# Patient Record
Sex: FEMALE | Race: WHITE | NOT HISPANIC OR LATINO | Employment: FULL TIME | URBAN - METROPOLITAN AREA
[De-identification: names, ages, dates, MRNs, and addresses within clinical notes are randomized per-mention and may not be internally consistent; named-entity substitution may affect disease eponyms.]

---

## 2024-07-22 ENCOUNTER — HOSPITAL ENCOUNTER (EMERGENCY)
Facility: HOSPITAL | Age: 39
Discharge: HOME | End: 2024-07-23
Attending: EMERGENCY MEDICINE

## 2024-07-22 DIAGNOSIS — M54.9 BACK PAIN: ICD-10-CM

## 2024-07-22 DIAGNOSIS — M54.12 CERVICAL RADICULOPATHY: Primary | ICD-10-CM

## 2024-07-22 LAB
B-HCG UR QL: NEGATIVE
CTP QC/QA: YES

## 2024-07-22 PROCEDURE — 96375 TX/PRO/DX INJ NEW DRUG ADDON: CPT

## 2024-07-22 PROCEDURE — 81025 URINE PREGNANCY TEST: CPT | Performed by: EMERGENCY MEDICINE

## 2024-07-22 PROCEDURE — 99285 EMERGENCY DEPT VISIT HI MDM: CPT | Mod: 25

## 2024-07-22 PROCEDURE — 96372 THER/PROPH/DIAG INJ SC/IM: CPT | Mod: 59 | Performed by: EMERGENCY MEDICINE

## 2024-07-22 PROCEDURE — 63600175 PHARM REV CODE 636 W HCPCS: Performed by: EMERGENCY MEDICINE

## 2024-07-22 PROCEDURE — 96374 THER/PROPH/DIAG INJ IV PUSH: CPT

## 2024-07-22 RX ORDER — TRIAMCINOLONE ACETONIDE 40 MG/ML
80 INJECTION, SUSPENSION INTRA-ARTICULAR; INTRAMUSCULAR
Status: COMPLETED | OUTPATIENT
Start: 2024-07-22 | End: 2024-07-22

## 2024-07-22 RX ADMIN — TRIAMCINOLONE ACETONIDE 80 MG: 200 INJECTION, SUSPENSION INTRA-ARTICULAR; INTRAMUSCULAR at 11:07

## 2024-07-23 VITALS
DIASTOLIC BLOOD PRESSURE: 88 MMHG | HEART RATE: 73 BPM | TEMPERATURE: 98 F | BODY MASS INDEX: 25.9 KG/M2 | HEIGHT: 67 IN | SYSTOLIC BLOOD PRESSURE: 137 MMHG | RESPIRATION RATE: 20 BRPM | WEIGHT: 165 LBS | OXYGEN SATURATION: 99 %

## 2024-07-23 LAB
ALBUMIN SERPL BCP-MCNC: 4.1 G/DL (ref 3.5–5.2)
ALP SERPL-CCNC: 91 U/L (ref 55–135)
ALT SERPL W/O P-5'-P-CCNC: 18 U/L (ref 10–44)
ANION GAP SERPL CALC-SCNC: 9 MMOL/L (ref 8–16)
AST SERPL-CCNC: 19 U/L (ref 10–40)
BASOPHILS # BLD AUTO: 0.06 K/UL (ref 0–0.2)
BASOPHILS NFR BLD: 0.8 % (ref 0–1.9)
BILIRUB SERPL-MCNC: 0.2 MG/DL (ref 0.1–1)
BUN SERPL-MCNC: 13 MG/DL (ref 6–20)
CALCIUM SERPL-MCNC: 9.4 MG/DL (ref 8.7–10.5)
CHLORIDE SERPL-SCNC: 107 MMOL/L (ref 95–110)
CO2 SERPL-SCNC: 24 MMOL/L (ref 23–29)
CREAT SERPL-MCNC: 0.9 MG/DL (ref 0.5–1.4)
DIFFERENTIAL METHOD BLD: ABNORMAL
EOSINOPHIL # BLD AUTO: 0.7 K/UL (ref 0–0.5)
EOSINOPHIL NFR BLD: 8.6 % (ref 0–8)
ERYTHROCYTE [DISTWIDTH] IN BLOOD BY AUTOMATED COUNT: 13.2 % (ref 11.5–14.5)
EST. GFR  (NO RACE VARIABLE): >60 ML/MIN/1.73 M^2
GLUCOSE SERPL-MCNC: 76 MG/DL (ref 70–110)
HCT VFR BLD AUTO: 43.2 % (ref 37–48.5)
HCV AB SERPL QL IA: NORMAL
HGB BLD-MCNC: 13.9 G/DL (ref 12–16)
HIV 1+2 AB+HIV1 P24 AG SERPL QL IA: NORMAL
IMM GRANULOCYTES # BLD AUTO: 0.03 K/UL (ref 0–0.04)
IMM GRANULOCYTES NFR BLD AUTO: 0.4 % (ref 0–0.5)
LYMPHOCYTES # BLD AUTO: 2.4 K/UL (ref 1–4.8)
LYMPHOCYTES NFR BLD: 29.7 % (ref 18–48)
MCH RBC QN AUTO: 29.5 PG (ref 27–31)
MCHC RBC AUTO-ENTMCNC: 32.2 G/DL (ref 32–36)
MCV RBC AUTO: 92 FL (ref 82–98)
MONOCYTES # BLD AUTO: 0.7 K/UL (ref 0.3–1)
MONOCYTES NFR BLD: 9.3 % (ref 4–15)
NEUTROPHILS # BLD AUTO: 4.1 K/UL (ref 1.8–7.7)
NEUTROPHILS NFR BLD: 51.2 % (ref 38–73)
NRBC BLD-RTO: 0 /100 WBC
PLATELET # BLD AUTO: 308 K/UL (ref 150–450)
PMV BLD AUTO: 10.4 FL (ref 9.2–12.9)
POTASSIUM SERPL-SCNC: 3.8 MMOL/L (ref 3.5–5.1)
PROT SERPL-MCNC: 7.6 G/DL (ref 6–8.4)
RBC # BLD AUTO: 4.71 M/UL (ref 4–5.4)
SODIUM SERPL-SCNC: 140 MMOL/L (ref 136–145)
WBC # BLD AUTO: 7.98 K/UL (ref 3.9–12.7)

## 2024-07-23 PROCEDURE — 87389 HIV-1 AG W/HIV-1&-2 AB AG IA: CPT | Performed by: PHYSICIAN ASSISTANT

## 2024-07-23 PROCEDURE — 86803 HEPATITIS C AB TEST: CPT | Performed by: PHYSICIAN ASSISTANT

## 2024-07-23 PROCEDURE — 80053 COMPREHEN METABOLIC PANEL: CPT | Performed by: EMERGENCY MEDICINE

## 2024-07-23 PROCEDURE — 63600175 PHARM REV CODE 636 W HCPCS: Performed by: EMERGENCY MEDICINE

## 2024-07-23 PROCEDURE — 85025 COMPLETE CBC W/AUTO DIFF WBC: CPT | Performed by: EMERGENCY MEDICINE

## 2024-07-23 RX ORDER — ONDANSETRON HYDROCHLORIDE 2 MG/ML
4 INJECTION, SOLUTION INTRAVENOUS
Status: COMPLETED | OUTPATIENT
Start: 2024-07-23 | End: 2024-07-23

## 2024-07-23 RX ORDER — METHOCARBAMOL 500 MG/1
500 TABLET, FILM COATED ORAL 3 TIMES DAILY PRN
Qty: 30 TABLET | Refills: 0 | Status: SHIPPED | OUTPATIENT
Start: 2024-07-23 | End: 2024-08-02

## 2024-07-23 RX ORDER — GABAPENTIN 300 MG/1
300 CAPSULE ORAL 3 TIMES DAILY PRN
Qty: 15 CAPSULE | Refills: 0 | Status: SHIPPED | OUTPATIENT
Start: 2024-07-23 | End: 2024-07-23

## 2024-07-23 RX ORDER — IBUPROFEN 600 MG/1
600 TABLET ORAL EVERY 6 HOURS PRN
Qty: 20 TABLET | Refills: 0 | Status: SHIPPED | OUTPATIENT
Start: 2024-07-23 | End: 2024-07-23

## 2024-07-23 RX ORDER — GABAPENTIN 300 MG/1
300 CAPSULE ORAL 3 TIMES DAILY PRN
Qty: 15 CAPSULE | Refills: 0 | Status: SHIPPED | OUTPATIENT
Start: 2024-07-23

## 2024-07-23 RX ORDER — MORPHINE SULFATE 2 MG/ML
6 INJECTION, SOLUTION INTRAMUSCULAR; INTRAVENOUS
Status: COMPLETED | OUTPATIENT
Start: 2024-07-23 | End: 2024-07-23

## 2024-07-23 RX ORDER — IBUPROFEN 600 MG/1
600 TABLET ORAL EVERY 6 HOURS PRN
Qty: 20 TABLET | Refills: 0 | Status: SHIPPED | OUTPATIENT
Start: 2024-07-23

## 2024-07-23 RX ORDER — METHOCARBAMOL 500 MG/1
500 TABLET, FILM COATED ORAL 3 TIMES DAILY PRN
Qty: 30 TABLET | Refills: 0 | Status: SHIPPED | OUTPATIENT
Start: 2024-07-23 | End: 2024-07-23

## 2024-07-23 RX ADMIN — MORPHINE SULFATE 6 MG: 2 INJECTION, SOLUTION INTRAMUSCULAR; INTRAVENOUS at 01:07

## 2024-07-23 RX ADMIN — ONDANSETRON 4 MG: 2 INJECTION INTRAMUSCULAR; INTRAVENOUS at 01:07

## 2024-07-23 NOTE — ED TRIAGE NOTES
"Heide Aguilera, a 39 y.o. female presents to the ED w/ complaint of left sided neck pain that started yesterday, thinks she might have "twerked" her back, states that the pain is radiating down her side and down left arm, reports left arm heaviness, states that this happened similar on the right side when she pulled her back out.     Triage note:  Chief Complaint   Patient presents with    Neck Pain     Patient reports pain radiating from left neck into left arm since Saturday. States that it is worsening.     Review of patient's allergies indicates:  No Known Allergies  History reviewed. No pertinent past medical history.      APPEARANCE: awake and alert in NAD.  SKIN: warm, dry and intact. No breakdown or bruising.  MUSCULOSKELETAL: Patient moving all extremities spontaneously, no obvious swelling or deformities noted. Ambulates independently. + left sided neck pain and arm pain  RESPIRATORY: Denies shortness of breath.Respirations unlabored.   CARDIAC: Denies CP, 2+ distal pulses; no peripheral edema  ABDOMEN: S/ND/NT, Denies nausea,   : voids spontaneously, denies difficulty  Neurologic: AAO x 4; follows commands equal strength in all extremities; denies numbness/tingling. Denies dizziness, BERT, pupils 3mm    "

## 2024-07-23 NOTE — ED PROVIDER NOTES
Encounter Date: 7/22/2024       History     Chief Complaint   Patient presents with    Neck Pain     Patient reports pain radiating from left neck into left arm since Saturday. States that it is worsening.     39-year-old female presents with pain to the left upper back that is worse with movement.  She feels like she has to hold her shoulder and arm in a certain way to alleviate the discomfort.  She had a similar problem that lasted for about a month on the right side.  She went to physical therapy and eventually it got better.  She took a muscle relaxant last night and it did not seem to help.  Denies any numbness or weakness to the arm.  No chest discomfort or shortness of breath.        Review of patient's allergies indicates:  No Known Allergies  History reviewed. No pertinent past medical history.  History reviewed. No pertinent surgical history.  No family history on file.     Review of Systems    Physical Exam     Initial Vitals   BP Pulse Resp Temp SpO2   07/22/24 2029 07/22/24 2028 07/22/24 2029 07/22/24 2029 07/22/24 2029   (!) 153/87 78 18 97.4 °F (36.3 °C) 100 %      MAP       --                Physical Exam    Constitutional: She appears well-developed and well-nourished. No distress.   HENT:   Head: Normocephalic.   Eyes: Conjunctivae are normal. Pupils are equal, round, and reactive to light.   Neck: Neck supple. No JVD present.   Normal range of motion.  Cardiovascular:  Normal rate, regular rhythm and normal heart sounds.           No murmur heard.  Pulmonary/Chest: Breath sounds normal. No respiratory distress. She has no wheezes. She has no rhonchi. She has no rales. She exhibits no tenderness.   Abdominal: Abdomen is soft. Bowel sounds are normal. She exhibits no distension. There is no abdominal tenderness. There is no rebound.   Musculoskeletal:      Cervical back: Normal range of motion and neck supple.      Comments: She has left paraspinous muscle tenderness in the upper back.  Neck is  supple with no tenderness     Neurological: She is alert. She has normal strength. No cranial nerve deficit or sensory deficit.   Careful testing of axillary radial ulnar medial nerve function in the left upper extremity were done and normal to strength and sensation.  Negative Thanh.  Strong radial pulse.  No pain on range of motion of the arm or shoulder.   Psychiatric: She has a normal mood and affect.         ED Course   Procedures  Labs Reviewed   CBC W/ AUTO DIFFERENTIAL - Abnormal       Result Value    WBC 7.98      RBC 4.71      Hemoglobin 13.9      Hematocrit 43.2      MCV 92      MCH 29.5      MCHC 32.2      RDW 13.2      Platelets 308      MPV 10.4      Immature Granulocytes 0.4      Gran # (ANC) 4.1      Immature Grans (Abs) 0.03      Lymph # 2.4      Mono # 0.7      Eos # 0.7 (*)     Baso # 0.06      nRBC 0      Gran % 51.2      Lymph % 29.7      Mono % 9.3      Eosinophil % 8.6 (*)     Basophil % 0.8      Differential Method Automated     COMPREHENSIVE METABOLIC PANEL    Sodium 140      Potassium 3.8      Chloride 107      CO2 24      Glucose 76      BUN 13      Creatinine 0.9      Calcium 9.4      Total Protein 7.6      Albumin 4.1      Total Bilirubin 0.2      Alkaline Phosphatase 91      AST 19      ALT 18      eGFR >60.0      Anion Gap 9     HIV 1 / 2 ANTIBODY   HEPATITIS C ANTIBODY   POCT URINE PREGNANCY    POC Preg Test, Ur Negative       Acceptable Yes            Imaging Results              MRI Cervical Spine Without Contrast (In process)                      MRI Thoracic Spine Without Contrast (In process)                      X-Ray Chest 1 View (Final result)  Result time 07/22/24 23:29:35      Final result by Jimmy Blackmon MD (07/22/24 23:29:35)                   Impression:      No acute findings in the chest.      Electronically signed by: Jimmy Blackmon MD  Date:    07/22/2024  Time:    23:29               Narrative:    EXAMINATION:  XR CHEST 1 VIEW    CLINICAL  HISTORY:  Dorsalgia, unspecified    TECHNIQUE:  Single frontal view of the chest was performed.    COMPARISON:  None    FINDINGS:  No consolidation, pleural effusion or pneumothorax.    Cardiomediastinal silhouette is unremarkable.                                       X-Ray Shoulder 2 or More Views Left (Final result)  Result time 07/22/24 23:29:57      Final result by Jimmy Blackmon MD (07/22/24 23:29:57)                   Impression:      No acute fracture.      Electronically signed by: Jimmy Blackmon MD  Date:    07/22/2024  Time:    23:29               Narrative:    EXAMINATION:  XR SHOULDER COMPLETE 2 OR MORE VIEWS LEFT    CLINICAL HISTORY:  sholder pain;    TECHNIQUE:  Two or three views of the left shoulder were performed.    COMPARISON:  None    FINDINGS:  Bones: No fracture.  No lytic or blastic lesion.    Joints: No evidence for glenohumeral dislocation.  Acromioclavicular joint is unremarkable.    Soft tissues: Unremarkable.                                       Medications   triamcinolone acetonide injection 80 mg (80 mg Intramuscular Given 7/22/24 3786)   morphine injection 6 mg (6 mg Intravenous Given 7/23/24 0102)   ondansetron injection 4 mg (4 mg Intravenous Given 7/23/24 0102)     Medical Decision Making  Patient with left upper back pain.  Seems muscular in nature.  Did x-ray of shoulder and chest that showed abnormality.  Highly doubt acute coronary syndrome, pulmonary embolus, aortic dissection.  No sign of septic joint dislocation nerve dysfunction.  I do not believe this is a cervical radiculitis.  Patient is requesting steroid shot and/or trigger point injection.  I found no area to inject would be considered a trigger point.  Will give cortisone injection and discharge home on NSAIDs and Robaxin recommend she follow up with an orthopedist.      I re-evaluated patient.  She states that she is in even worse pain than when she got here.  The pain is unbearable to her lower neck  radiating down her arm.  She is requesting an MRI and stronger pain medication.  Will obtain MRI of her cervical and thoracic spine.  Will give dose of morphine.  Still has no neurologic dysfunction.  Case signed out to Dr. Barreto at 1:45 a.m.    Amount and/or Complexity of Data Reviewed  Labs: ordered.  Radiology: ordered.    Risk  Prescription drug management.                                      Clinical Impression:  Final diagnoses:  [M54.9] Back pain                 Deepak Tamez MD  07/23/24 0135

## 2024-07-23 NOTE — FIRST PROVIDER EVALUATION
Medical screening examination initiated.  I have conducted a focused provider triage encounter, findings are as follows:    Brief history of present illness:  39-year-old female complaining of left upper back/neck pain radiating to her left arm.  Her symptoms began on Saturday and have been progressively worsening.  Denies any numbness.  No fevers or chills.  Pain is rated to 9/10.    Vitals:    07/22/24 2028   Pulse: 78   TempSrc: Oral       Pertinent physical exam:  No neurovascular deficits, pain    Brief workup plan:  UPT, evaluation by provider    Preliminary workup initiated; this workup will be continued and followed by the physician or advanced practice provider that is assigned to the patient when roomed.    Brian Ward DO, FAAEM  Emergency Staff Physician   Dept of Emergency Medicine   [facility]  Spectralink: [id number]        Disclaimer: This note has been generated using voice-recognition software. There may be typographical errors that have been missed during proof-reading.

## 2024-07-23 NOTE — PROVIDER PROGRESS NOTES - EMERGENCY DEPT.
Imaging Results              MRI Cervical Spine Without Contrast (Final result)  Result time 07/23/24 02:47:37      Final result by Margarito Garcia MD (07/23/24 02:47:37)                   Impression:      No focal cord signal abnormality.    Multilevel mild cervical spondylosis detailed as above.  No spinal stenosis.  Mild to moderate neural foramina narrowing at C5-C7.    Electronically signed by resident: Audrey Mac  Date:    07/23/2024  Time:    02:09    Electronically signed by: Margarito Garcia MD  Date:    07/23/2024  Time:    02:47               Narrative:    EXAMINATION:  MRI CERVICAL SPINE WITHOUT CONTRAST; MRI THORACIC SPINE WITHOUT CONTRAST    CLINICAL HISTORY:  Myelopathy, chronic, cervical spine;; Mid-back pain;    TECHNIQUE:  Multiplanar, multisequence MR images of the cervical and thoracic spine were performed without the administration of contrast.    COMPARISON:  None.    FINDINGS:  C1-C2: Bilateral C1 lateral masses are normal.  Odontoid is intact.  Pre dens space is maintained.    Alignment: No spondylolisthesis.    Vertebrae: No acute fracture.  No bone marrow replacing process to suggest infiltrative disease.  Fatty degeneration (Modic type II) at C4-C5 endplate and edematous degeneration (Modic type I) at C5-C6 endplate.  T2 and T4 vertebral body hemangioma.  Vertebral body height is maintained.    Discs: Mild disc height loss at C4-C5, C5-C6, and C6-C7.  Small posterior disc osteophyte complexes at these levels, most notable at C4-C5 and C6-C7.    Cord: Normal spinal cord signal.  Conus medullaris terminates at L1.    Skull base and craniocervical junction: Unremarkable.    Degenerative findings:    C-spine: Disc osteophyte complex with bilateral uncovertebral spurring at C4-C7.  Mild spinal canal stenosis at C4-C5 and C6-C7.  Mild to moderate bilateral neural foraminal narrowing at C5-C6, and C6-C7.  Mild bilateral neural foraminal narrowing at C4-C5.    T-spine: No significant degenerative  changes.  No spinal canal stenosis or neural foraminal narrowing.    Paraspinal muscles & soft tissues: Posterior midline 1.1 cm subcutaneous inclusion cyst at T7 level.                                       MRI Thoracic Spine Without Contrast (Final result)  Result time 07/23/24 02:47:37      Final result by Margarito Garcia MD (07/23/24 02:47:37)                   Impression:      No focal cord signal abnormality.    Multilevel mild cervical spondylosis detailed as above.  No spinal stenosis.  Mild to moderate neural foramina narrowing at C5-C7.    Electronically signed by resident: Audrey Mac  Date:    07/23/2024  Time:    02:09    Electronically signed by: Margarito Garcia MD  Date:    07/23/2024  Time:    02:47               Narrative:    EXAMINATION:  MRI CERVICAL SPINE WITHOUT CONTRAST; MRI THORACIC SPINE WITHOUT CONTRAST    CLINICAL HISTORY:  Myelopathy, chronic, cervical spine;; Mid-back pain;    TECHNIQUE:  Multiplanar, multisequence MR images of the cervical and thoracic spine were performed without the administration of contrast.    COMPARISON:  None.    FINDINGS:  C1-C2: Bilateral C1 lateral masses are normal.  Odontoid is intact.  Pre dens space is maintained.    Alignment: No spondylolisthesis.    Vertebrae: No acute fracture.  No bone marrow replacing process to suggest infiltrative disease.  Fatty degeneration (Modic type II) at C4-C5 endplate and edematous degeneration (Modic type I) at C5-C6 endplate.  T2 and T4 vertebral body hemangioma.  Vertebral body height is maintained.    Discs: Mild disc height loss at C4-C5, C5-C6, and C6-C7.  Small posterior disc osteophyte complexes at these levels, most notable at C4-C5 and C6-C7.    Cord: Normal spinal cord signal.  Conus medullaris terminates at L1.    Skull base and craniocervical junction: Unremarkable.    Degenerative findings:    C-spine: Disc osteophyte complex with bilateral uncovertebral spurring at C4-C7.  Mild spinal canal stenosis at C4-C5  and C6-C7.  Mild to moderate bilateral neural foraminal narrowing at C5-C6, and C6-C7.  Mild bilateral neural foraminal narrowing at C4-C5.    T-spine: No significant degenerative changes.  No spinal canal stenosis or neural foraminal narrowing.    Paraspinal muscles & soft tissues: Posterior midline 1.1 cm subcutaneous inclusion cyst at T7 level.                                       X-Ray Chest 1 View (Final result)  Result time 07/22/24 23:29:35      Final result by Jimmy Blackmon MD (07/22/24 23:29:35)                   Impression:      No acute findings in the chest.      Electronically signed by: Jimmy Blackmon MD  Date:    07/22/2024  Time:    23:29               Narrative:    EXAMINATION:  XR CHEST 1 VIEW    CLINICAL HISTORY:  Dorsalgia, unspecified    TECHNIQUE:  Single frontal view of the chest was performed.    COMPARISON:  None    FINDINGS:  No consolidation, pleural effusion or pneumothorax.    Cardiomediastinal silhouette is unremarkable.                                       X-Ray Shoulder 2 or More Views Left (Final result)  Result time 07/22/24 23:29:57      Final result by Jimmy Blackmon MD (07/22/24 23:29:57)                   Impression:      No acute fracture.      Electronically signed by: Jimmy Blackmon MD  Date:    07/22/2024  Time:    23:29               Narrative:    EXAMINATION:  XR SHOULDER COMPLETE 2 OR MORE VIEWS LEFT    CLINICAL HISTORY:  sholder pain;    TECHNIQUE:  Two or three views of the left shoulder were performed.    COMPARISON:  None    FINDINGS:  Bones: No fracture.  No lytic or blastic lesion.    Joints: No evidence for glenohumeral dislocation.  Acromioclavicular joint is unremarkable.    Soft tissues: Unremarkable.                                     MRI with mild neural foraminal narrowing at C5 through C7, possibly the cause of her radicular pain.  Plan on conservative treatment with NSAIDs and muscle relaxants.  Referral to pain management.  Stable for  discharge home with outpatient follow up.

## 2024-07-30 ENCOUNTER — TELEPHONE (OUTPATIENT)
Dept: PAIN MEDICINE | Facility: CLINIC | Age: 39
End: 2024-07-30

## 2024-07-30 NOTE — TELEPHONE ENCOUNTER
----- Message from Veronika Vela sent at 7/30/2024 10:57 AM CDT -----  Regarding: same day appt  Name of caller: maryse       What is the requesting detail: pt is requesting to be seen today. States she is in far too much pain and cannot wait until 8-6 to be seen. Please advise       Can the clinic reply by [facility]:       What number to call back: [phone]

## 2024-08-01 ENCOUNTER — TELEPHONE (OUTPATIENT)
Dept: PAIN MEDICINE | Facility: CLINIC | Age: 39
End: 2024-08-01

## 2024-08-01 NOTE — TELEPHONE ENCOUNTER
Staff tried contacting the patient in regards to her message about getting seen sooner. Pt did not answer staff left a message for a call back.

## 2024-08-01 NOTE — TELEPHONE ENCOUNTER
----- Message from Marylu Anderson sent at 7/30/2024  1:08 PM CDT -----  Regarding: appt  Type:  Patient Returning Call      Name of who is calling:Pio/partner        What is request in detail:Pio is requesting a call back in regards to getting appt for today for patient.she is in a lot of pain and out of her gabapentin      Can clinic reply by [facility]:no        What number to call back if not in [facility]:

## 2024-08-06 ENCOUNTER — OFFICE VISIT (OUTPATIENT)
Dept: SPINE | Facility: CLINIC | Age: 39
End: 2024-08-06
Attending: ANESTHESIOLOGY

## 2024-08-06 VITALS
DIASTOLIC BLOOD PRESSURE: 93 MMHG | HEART RATE: 78 BPM | HEIGHT: 67 IN | BODY MASS INDEX: 25.84 KG/M2 | SYSTOLIC BLOOD PRESSURE: 153 MMHG | RESPIRATION RATE: 18 BRPM

## 2024-08-06 DIAGNOSIS — M79.18 MYOFASCIAL PAIN: Primary | ICD-10-CM

## 2024-08-06 DIAGNOSIS — M54.12 CERVICAL RADICULOPATHY: ICD-10-CM

## 2024-08-06 PROCEDURE — 99213 OFFICE O/P EST LOW 20 MIN: CPT | Mod: PBBFAC | Performed by: ANESTHESIOLOGY

## 2024-08-06 PROCEDURE — 20552 NJX 1/MLT TRIGGER POINT 1/2: CPT | Mod: S$PBB,,, | Performed by: ANESTHESIOLOGY

## 2024-08-06 PROCEDURE — 99999PBSHW PR PBB SHADOW TECHNICAL ONLY FILED TO HB: Mod: PBBFAC,,,

## 2024-08-06 PROCEDURE — 20552 NJX 1/MLT TRIGGER POINT 1/2: CPT | Mod: PBBFAC,GC | Performed by: ANESTHESIOLOGY

## 2024-08-06 PROCEDURE — 99999 PR PBB SHADOW E&M-EST. PATIENT-LVL III: CPT | Mod: PBBFAC,,, | Performed by: ANESTHESIOLOGY

## 2024-08-06 PROCEDURE — 99204 OFFICE O/P NEW MOD 45 MIN: CPT | Mod: 25,S$PBB,, | Performed by: ANESTHESIOLOGY

## 2024-08-06 RX ORDER — TRIAMCINOLONE ACETONIDE 40 MG/ML
40 INJECTION, SUSPENSION INTRA-ARTICULAR; INTRAMUSCULAR
Status: COMPLETED | OUTPATIENT
Start: 2024-08-06 | End: 2024-08-06

## 2024-08-06 RX ORDER — GABAPENTIN 300 MG/1
300 CAPSULE ORAL 3 TIMES DAILY PRN
Qty: 15 CAPSULE | Refills: 0 | Status: SHIPPED | OUTPATIENT
Start: 2024-08-06

## 2024-08-06 RX ADMIN — TRIAMCINOLONE ACETONIDE 40 MG: 40 INJECTION, SUSPENSION INTRA-ARTICULAR; INTRAMUSCULAR at 03:08

## 2024-08-28 ENCOUNTER — TELEPHONE (OUTPATIENT)
Dept: SPINE | Facility: CLINIC | Age: 39
End: 2024-08-28

## 2024-08-28 NOTE — TELEPHONE ENCOUNTER
LVM c pt. Attempting to reschedule appt location & time c Hamilton Deleon MD Requested a call back to the [facility] at [phone] with any questions, concerns for a different appointment time.